# Patient Record
Sex: FEMALE | URBAN - METROPOLITAN AREA
[De-identification: names, ages, dates, MRNs, and addresses within clinical notes are randomized per-mention and may not be internally consistent; named-entity substitution may affect disease eponyms.]

---

## 2019-06-23 ENCOUNTER — HOSPITAL ENCOUNTER (EMERGENCY)
Facility: CLINIC | Age: 34
Discharge: HOME | End: 2019-06-23
Attending: EMERGENCY MEDICINE | Admitting: EMERGENCY MEDICINE
Payer: COMMERCIAL

## 2019-06-23 VITALS
WEIGHT: 155 LBS | HEIGHT: 69 IN | BODY MASS INDEX: 22.96 KG/M2 | RESPIRATION RATE: 16 BRPM | TEMPERATURE: 97.9 F | DIASTOLIC BLOOD PRESSURE: 73 MMHG | OXYGEN SATURATION: 100 % | SYSTOLIC BLOOD PRESSURE: 112 MMHG | HEART RATE: 79 BPM

## 2019-06-23 DIAGNOSIS — F22 PARANOIA (H): ICD-10-CM

## 2019-06-23 LAB
AMPHETAMINES UR QL SCN: NEGATIVE
BARBITURATES UR QL: NEGATIVE
BENZODIAZ UR QL: NEGATIVE
CANNABINOIDS UR QL SCN: NEGATIVE
COCAINE UR QL: NEGATIVE
OPIATES UR QL SCN: NEGATIVE
PCP UR QL SCN: NEGATIVE

## 2019-06-23 PROCEDURE — 99243 OFF/OP CNSLTJ NEW/EST LOW 30: CPT | Performed by: PSYCHIATRY & NEUROLOGY

## 2019-06-23 PROCEDURE — 25000132 ZZH RX MED GY IP 250 OP 250 PS 637: Performed by: EMERGENCY MEDICINE

## 2019-06-23 PROCEDURE — 99285 EMERGENCY DEPT VISIT HI MDM: CPT | Mod: 25

## 2019-06-23 PROCEDURE — 90791 PSYCH DIAGNOSTIC EVALUATION: CPT

## 2019-06-23 PROCEDURE — 80307 DRUG TEST PRSMV CHEM ANLYZR: CPT | Performed by: EMERGENCY MEDICINE

## 2019-06-23 RX ORDER — OLANZAPINE 5 MG/1
5 TABLET, ORALLY DISINTEGRATING ORAL
Status: COMPLETED | OUTPATIENT
Start: 2019-06-23 | End: 2019-06-23

## 2019-06-23 RX ADMIN — OLANZAPINE 5 MG: 5 TABLET, ORALLY DISINTEGRATING ORAL at 03:45

## 2019-06-23 ASSESSMENT — MIFFLIN-ST. JEOR: SCORE: 1464.52

## 2019-06-23 ASSESSMENT — ENCOUNTER SYMPTOMS: SLEEP DISTURBANCE: 1

## 2019-06-23 NOTE — ED NOTES
Bed: [facility]  Expected date:   Expected time:   Means of arrival:   Comments:  [facility] [phone] [phone]- MH Cresencio

## 2019-06-23 NOTE — ED TRIAGE NOTES
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  Pt sleep deprived due to training and became paranoid on her flight home.  Stating the screens were talking to her on the airplane.  Pt, [place] and were not able to come with the pt to the hospital because Pt wife is not .  Pt believes a male and female were nearby ready to kidnap their child

## 2019-06-23 NOTE — ED NOTES
Pt's wife (Alma [phone]) called to check on patient, requested to speak to her. Updated given, and call transferred to patient's room.

## 2019-06-23 NOTE — ED AVS SNAPSHOT
Emergency Department  [address] [facility] [address]  Phone:  [phone]  Fax:  [phone]                                    Natasha Reveles   MRN: [id number]    Department:   Emergency Department   Date of Visit:  6/23/2019           After Visit Summary Signature Page    I have received my discharge instructions, and my questions have been answered. I have discussed any challenges I see with this plan with the nurse or doctor.    ..........................................................................................................................................  Patient/Patient Representative Signature      ..........................................................................................................................................  Patient Representative Print Name and Relationship to Patient    ..................................................               ................................................  Date                                   Time    ..........................................................................................................................................  Reviewed by Signature/Title    ...................................................              ..............................................  Date                                               Time          22EPIC Rev 08/18

## 2019-06-23 NOTE — ED NOTES
Received a phone call from patients wife. She does know patient is here and she is very concerned about the paranoid behavior. [place] [employer], [place] hotel. Patient did speak on the phone to Alma.

## 2019-06-23 NOTE — ED NOTES
Ms. Marquez, patient's wife, came to pick her up. We spoke in private, she asked for recommendations on how to handle her at home. Informed her that if, she, at any time feels threatened by her or if patient herself is in danger to call 911, but definitely reach out to the resources provided to patient for follow up.

## 2019-06-23 NOTE — CONSULTS
"Consult Date:  06/23/2019      PSYCHIATRIC CONSULTATION      REASON FOR CONSULTATION:  Delusions, paranoia.      REQUESTING CLINICIAN:  Ramila Amaya MD      CHIEF COMPLAINT:  \"I was getting subliminal messages on my screen.\"      HISTORY OF PRESENT ILLNESS:  Natasha Reveles is a 33-year-old female with no known psychiatric history that was brought to our Emergency Department by way of EMS for paranoia.  Emergency Services were summoned in review of the admitting documentation from the ED physician in response to her being found at the  of a hotel on [facility] [address] [facility] acting erratically.  They state \"the patient seemed out of it and had concern for lack of sleep.\"  The patient was conveying paranoia to them that was present earlier in the day as well.  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  En route to the hospital, they described her as being lucid and alert.  She told the ED doctors that she returned from [facility] after being there for a week and went home because she felt unsafe there.  Upon returning home, she saw scam calls on her phone and believed that someone was threatening her daughter.  This led to her bringing her wife and daughter out of her house into the hotel on the [facility].  She reported to them difficulty sleeping the prior 4 days.  She noted one similar occasion in [facility] in the past, which she attributed to lack of sleep and loud noises.  She denied substance abuse, other than occasional alcohol use.  She denied any suicidal ideation, homicidal ideation, or other safety concerns.  She ended up receiving a dose of 5 mg of olanzapine at 3:45 this morning, which was helpful in getting her some sleep.      On my interview, patient is fully alert and oriented.  She concedes that she is here because \"she was getting subliminal messages on my screen.\"  She tells me that this emerged in what she described as \"a lot of subliminal " "messages.\"  She denied any hallucinosis.  She states that she was watching TV and saw a lot of topics related to President Trump, [place], collusion, and [employer].  She notes that she turned off the TV, not thinking much of it, and this began evolving into hearing \"funky sirens\" that would not stop and led to a heightened state of paranoia and then began experiencing \"people talking, but not talking.\"  She tells me this is the first time something like this has happened, though there was mentioned to the DEC  about something occurring 2 years ago and this is perhaps in reference to the aforementioned discussion of something happening in [facility].  She recalls at that time that her sleep was disrupted but does not recall anything further.  She states that this resolved on its own without any interventions.  She notes with the Zyprexa she felt a bit dizzy and disoriented, though she is feeling a bit better today.  This is consistent with treatment team observations that she is clearing a bit, though still has some variable delusions and paranoia.  I discussed with her my recommendations for psychiatric hospitalization for the potential of management of bipolar disorder versus unspecified psychotic disorder.  She, however, was not interested in this disposition as she reports wanting to research places on her own to get help and that she does not trust our hospital.  She denies any safety concerns.      PAST PSYCHIATRIC HISTORY:  Denied.      PAST CHEMICAL DEPENDENCY HISTORY:  Denied.      PAST MEDICAL HISTORY:  Denied.      FAMILY HISTORY:  Denied.      SOCIAL HISTORY:  The patient is  with a 3-year-old daughter.  She drinks alcohol in a social fashion.  She lives with her wife and daughter.  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      REVIEW OF SYSTEMS:  Ten-point review of systems completed and negative, other than noted in HPI.    " "  ALLERGIES:  NO KNOWN DRUG ALLERGIES.      PRIOR TO ADMISSION MEDICATIONS:  None.      MENTAL STATUS EXAMINATION:  Age-appearing female with situationally appropriate grooming and hygiene.  Calm and cooperative with good eye contact.  Awake, alert and globally oriented.  Cooperative through interview.  Mood was described as \"fine.\"  Speech was fluent, spontaneous, clear and nonpressured.  Thoughts were generally linear, logical and goal directed, though some paranoia would peak in at times, as well as delusional endorsements.  No observation of response to internal stimuli.  No fluctuation in cognition appreciated.  Intelligence estimate is average.  Memory is grossly intact.  Attention and concentration were well maintained.  Insight and judgment are fair.  Denies suicidal or homicidal ideation, intent or plan.      DIAGNOSIS:  Unspecified psychotic disorder (rule out bipolar disorder).      ASSESSMENT:  Natasha Reveles is a 33-year-old female with no past psychiatric history, who was brought to our hospital given concerns for altered mental status comprised of delusions and paranoia.  There is reportedly 1 past episode 2 years ago, the full details of which we do not know, though it sounds as though there may have been some sleep disruption associated with it, though I am not aware of any indication of psychotic symptoms at that time.  I attempted to contact the patient's wife for collateral information (Alma at [phone]), though was sent straight to voicemail.  Given the information we have at present, I do not think the patient is holdable, although I do recommend psychiatric hospitalization for stabilization and continued observation.  My suspicion is she will require antipsychotic plus/minus mood stabilizer going forward and assessing tolerance to these medications and benefit would be the rationale for hospitalization.  The patient, however, does not want to be hospitalized as she reports not trusting " our hospital and wanting to independently research mental health providers on her own.  She is aware that discharging would be against my advice, though again given the absence of any acute safety concerns, I do not see that she is holdable.  If, however, she returns to the hospital with further decompensation would likely place patient on a hold if she is not agreeable to the hospitalization, as there is a potential this will continue to worsen absent pharmacological treatment.      PLAN:   1.  Recommend psychiatric hospitalization for diagnostic clarification and symptomatic improvement, though the patient refuses this.  I do not deem her to be holdable at this time, as there are no active safety concerns.   2.  I spoke with the DEC , and they will provide her with some mental health resources, which she informs me she will follow up on.         LAINE SEWELL DO             D: 2019   T: 2019   MT: ROSALIA      Name:     BEV LANCASTER   MRN:      [id number]        Account:       [id number]   :      1985           Consult Date:  2019      Document: [id number]       cc: Ramila Amaya MD

## 2019-06-23 NOTE — ED NOTES
Patient getting ready to get discharged, belongings given and patient calling her wife to get picked up.

## 2019-06-23 NOTE — ED NOTES
Patient awake now, ambulatory with steady gait, used the restroom and approached to nurse station breakfast menu offered and she requested to use her cell phone stating that she has a very important phone call to make to her boss. Cell phone use granted after explained ground rules of just one call for now until she gets evaluated by DEC and that this nurse will monitor her. She agreed, she sat in the hallway, made her phone call but then she became more secretive and whispering, then walked to the room, as I'm following her, she closed the door shout. I open the door and told her that she needs to keep the door open, then she started to pull it. Security and another nurse had to come and assist until pt cooperated. Pt finished her conversation and cell phone was placed back in her locker. DEC assessment proceeded after this.

## 2019-06-23 NOTE — ED NOTES
"Patient in a hurry to leave, trying to walk out of the door. Patient's wife called and was informed that patient is ready to be picked up, states she'll be here in 20-30 mins. Informed patient to wait for her ride to arrive but got irritated and stated \"I'm an adult and you can not hold me in here, if I want to go I should be able to go\". I insisted that she needs a safe ride home so she reply \"I'll get an Uber then\". Patient sat down on the hallway with her celphone on her hands.   "

## 2019-06-23 NOTE — ED PROVIDER NOTES
History   Chief Complaint:  Paranoia    HPI   Natasha Reveles is an otherwise healthy 33 year old female who presents via EMS with paranoia. EMS reports that they were contacted after the patient was found at the  at a hotel on [facility] [facility] acting abnormally. They state that the patient seemed out of it and have concern for lack of sleep. The patient was also having what EMS describes as paranoia earlier in the day. They describe that while on a flight, the screens on the plane were speaking to the patient, telling her that she was unsafe. 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 En route, the patient was lucid and alert. In the ED, the patient reports that she returned from [facility] today after being there for one week. She went home because she felt unsafe there. Upon arriving home, the patient notes observing scam calls on her phone and further believes that someone was threatening her daughter. This prompted her to bring her wife and daughter out of her house and to the hotel on the [facility]. She endorses difficulty sleeping the last four days and states that yesterday was particularly stressful. The patient does note similar symptoms on one other occasion in [facility] she attributes to lack of sleep and loud noises occurring close to her. She does attest to the occasional alcohol use but denies drug use and suicidal ideation. The patient has no mental health history.    Allergies:  No known drug allergies     Medications:    The patient is currently on no regular medications.     Past Medical History:    History reviewed. No pertinent past medical history.    Past Surgical History:    History reviewed. No pertinent surgical history.    Family History:    History reviewed. No pertinent family history.     Social History:  Alcohol Use: Yes, occasional  Drug Use: No  Marital Status:      Review of Systems  "  Psychiatric/Behavioral: Positive for sleep disturbance. Negative for suicidal ideas.        Positive for delusional thought   All other systems reviewed and are negative.    Physical Exam     Patient Vitals for the past 24 hrs:   BP Temp Temp src Heart Rate Resp SpO2 Height Weight   06/23/19 0333 141/73 97.9  F (36.6  C) Oral 92 16 100 % 1.74 m (5' 8.5\") 70.3 kg (155 lb)     Physical Exam     General: Sitting on edge of bed  Eyes:  The pupils are equal and round    Conjunctivae and sclerae are normal  ENT:    Moist mucous membranes  Neck:  Normal range of motion  CV:  Regular rate and rhythm    Skin warm and well perfused   Resp:  Non labored breathing on room air  MS:  Normal muscular tone  Skin:  No rash or acute skin lesions noted  Neuro:   Awake, alert.      Speech is normal and fluent.    Face is symmetric.     Moves all extremities equally  Psych: Flat affect. Some paranoia, delusional. Once I left room, patient came out of room and thought she recognized a nurse, then started laughing inappropriately    Emergency Department Course   Laboratory:  Drug Abuse Screen (Urine): negative    Interventions:  0345: Zyprexa 5 mg PO    Emergency Department Course:  The patient presents to the ED via EMS.     Past medical records, nursing notes, and vitals reviewed.   0252: I performed an exam of the patient and obtained history, as documented above.  The patient provided a urine sample here in the emergency department. This was sent for laboratory testing, findings above.    Patient signed out to oncoming ED physician, Dr. Amaya pending a DEC evaluation.    Impression & Plan    Medical Decision Making:  Natasha Reveles is a 33-year-old female who presented to the emergency department with paranoia.  Per EMS report, they thought she was paranoid.  Unclear how much of her story is actually true but does seem paranoid.  Possibly is delusional depending on if the story is actually true.  Was calm and cooperative on " initial exam.  When I left the room patient came out of the room and thought she recognized a nurse but the nurse did not recognize the patient and then the patient started laughing inappropriately.  Possibly showing some signs of erica.  Drug screen is negative.  Patient slept after taking oral zyprexa. Nurse spoke to patient's wife who said that she is acting paranoid. Patient signed out to Dr. Amaya pending DEC assessment.    Diagnosis:    ICD-10-CM    1. Paranoia (H) F22        Disposition:  Signed out to Dr. Monique Ortiz  6/23/2019    EMERGENCY DEPARTMENT  Skinny MAGUIRE am serving as a scribe at 2:52 AM on 6/23/2019 to document services personally performed by Ramila Zapien MD based on my observations and the provider's statements to me.        Ramila Zapien MD  06/23/19 0529

## 2019-06-23 NOTE — CONSULTS
Patient seen for initial psychiatric consultation. Refer to dictated note.    Galen Kim,       Patient seen for initial psychiatric consultation. Refer to dictated note. Recommended hospitalization, but patient refused. Deemed non-holdable. DEC providing mental health tx resources. Free to discharge per ED team.    Galen Kim, DO